# Patient Record
Sex: FEMALE
[De-identification: names, ages, dates, MRNs, and addresses within clinical notes are randomized per-mention and may not be internally consistent; named-entity substitution may affect disease eponyms.]

---

## 2024-09-17 PROBLEM — Z00.00 ENCOUNTER FOR PREVENTIVE HEALTH EXAMINATION: Status: ACTIVE | Noted: 2024-09-17

## 2024-09-18 ENCOUNTER — APPOINTMENT (OUTPATIENT)
Dept: HEART AND VASCULAR | Facility: CLINIC | Age: 69
End: 2024-09-18
Payer: MEDICARE

## 2024-09-18 ENCOUNTER — NON-APPOINTMENT (OUTPATIENT)
Age: 69
End: 2024-09-18

## 2024-09-18 VITALS — DIASTOLIC BLOOD PRESSURE: 80 MMHG | SYSTOLIC BLOOD PRESSURE: 152 MMHG

## 2024-09-18 VITALS — DIASTOLIC BLOOD PRESSURE: 70 MMHG | SYSTOLIC BLOOD PRESSURE: 144 MMHG

## 2024-09-18 VITALS
DIASTOLIC BLOOD PRESSURE: 70 MMHG | HEIGHT: 59 IN | WEIGHT: 115 LBS | TEMPERATURE: 98.3 F | SYSTOLIC BLOOD PRESSURE: 156 MMHG | BODY MASS INDEX: 23.18 KG/M2 | HEART RATE: 76 BPM | OXYGEN SATURATION: 99 %

## 2024-09-18 DIAGNOSIS — I10 ESSENTIAL (PRIMARY) HYPERTENSION: ICD-10-CM

## 2024-09-18 DIAGNOSIS — Z78.9 OTHER SPECIFIED HEALTH STATUS: ICD-10-CM

## 2024-09-18 DIAGNOSIS — Z82.49 FAMILY HISTORY OF ISCHEMIC HEART DISEASE AND OTHER DISEASES OF THE CIRCULATORY SYSTEM: ICD-10-CM

## 2024-09-18 PROCEDURE — 93000 ELECTROCARDIOGRAM COMPLETE: CPT

## 2024-09-18 PROCEDURE — G2211 COMPLEX E/M VISIT ADD ON: CPT

## 2024-09-18 PROCEDURE — 99203 OFFICE O/P NEW LOW 30 MIN: CPT

## 2024-09-18 RX ORDER — MULTIVITAMIN
TABLET ORAL
Refills: 0 | Status: ACTIVE | COMMUNITY

## 2024-09-18 RX ORDER — AMLODIPINE BESYLATE 2.5 MG/1
2.5 TABLET ORAL DAILY
Qty: 30 | Refills: 1 | Status: ACTIVE | COMMUNITY
Start: 2024-09-18 | End: 1900-01-01

## 2024-09-18 NOTE — HISTORY OF PRESENT ILLNESS
[FreeTextEntry1] : 70 yo F pmhx psoriasis requiring injections never smoker, no alcohol, no known hx of HTN, HLD, DM pcp: dr Reid   getting a r eye cataract procedure done oct 1st with Dr Gunjan Pitt -fax [phone]. having L eye done on November 5th. walking to [facility] for exercise. pt denies chest pain, palpitations, SOB, orthopnea, syncope.  states her blood pressure is typically 120s/70s. never checks BP at home. no recent changes in lifestyle -chicken, pork, rice, vegetables is a typical diet for her. is conscious of salty foods when she eats. has a wrist cuff at home. she admits being very nervous for her upcoming procedure right now   [id number]   She denies having any chest pain, SOB, LAMA, dizziness, palpitations, orthopnea, PND or syncope

## 2024-09-18 NOTE — ASSESSMENT
[FreeTextEntry1] : An EKG was performed to evaluate for arrhythmia and ischemia.  Hypertension--We discussed a goal of /80 or lower in the office. BP  above   goal. Start Amlodipine 2.5 mg daily  I informed the patient that  I did not find a Cardiovascular contraindication to the proposed cataract surgery at this time   I encouraged continued risk factor reduction and gradual increase in aerobic activity as tolerated  30   minutes were spent discussing cardiac risk excluding procedure time

## 2024-11-12 ENCOUNTER — RX RENEWAL (OUTPATIENT)
Age: 69
End: 2024-11-12